# Patient Record
Sex: FEMALE | Race: WHITE | NOT HISPANIC OR LATINO | URBAN - METROPOLITAN AREA
[De-identification: names, ages, dates, MRNs, and addresses within clinical notes are randomized per-mention and may not be internally consistent; named-entity substitution may affect disease eponyms.]

---

## 2017-05-12 ENCOUNTER — OFFICE (OUTPATIENT)
Dept: URBAN - METROPOLITAN AREA CLINIC 75 | Facility: CLINIC | Age: 67
End: 2017-05-12
Payer: COMMERCIAL

## 2017-05-12 VITALS
SYSTOLIC BLOOD PRESSURE: 118 MMHG | DIASTOLIC BLOOD PRESSURE: 68 MMHG | WEIGHT: 167 LBS | HEART RATE: 92 BPM | HEIGHT: 66 IN

## 2017-05-12 DIAGNOSIS — Z86.010 PERSONAL HISTORY OF COLONIC POLYPS: ICD-10-CM

## 2017-05-12 DIAGNOSIS — R10.13 EPIGASTRIC PAIN: ICD-10-CM

## 2017-05-12 DIAGNOSIS — K59.1 FUNCTIONAL DIARRHEA: ICD-10-CM

## 2017-05-12 PROCEDURE — 99204 OFFICE O/P NEW MOD 45 MIN: CPT

## 2017-05-12 RX ORDER — DICYCLOMINE HYDROCHLORIDE 20.6 MG/1
80 TABLET ORAL
Qty: 120 | Refills: 10 | Status: ACTIVE
Start: 2017-05-12

## 2017-05-24 ENCOUNTER — OFFICE (OUTPATIENT)
Dept: URBAN - METROPOLITAN AREA CLINIC 75 | Facility: CLINIC | Age: 67
End: 2017-05-24
Payer: MEDICARE

## 2017-05-24 DIAGNOSIS — R19.7 DIARRHEA, UNSPECIFIED: ICD-10-CM

## 2017-05-24 DIAGNOSIS — Z86.010 PERSONAL HISTORY OF COLONIC POLYPS: ICD-10-CM

## 2017-05-24 PROCEDURE — 82272 OCCULT BLD FECES 1-3 TESTS: CPT

## 2022-06-16 ENCOUNTER — HOSPITAL ENCOUNTER (OUTPATIENT)
Dept: CARDIOLOGY | Facility: HOSPITAL | Age: 72
Discharge: HOME | End: 2022-06-16
Admitting: NURSE PRACTITIONER

## 2022-06-16 ENCOUNTER — TRANSCRIBE ORDERS (OUTPATIENT)
Dept: ADMINISTRATIVE | Facility: HOSPITAL | Age: 72
End: 2022-06-16

## 2022-06-16 DIAGNOSIS — R60.0 LEG EDEMA, LEFT: ICD-10-CM

## 2022-06-16 DIAGNOSIS — M79.605 LEFT LEG PAIN: ICD-10-CM

## 2022-06-16 DIAGNOSIS — M79.605 LEFT LEG PAIN: Primary | ICD-10-CM

## 2022-06-16 LAB
BH CV LOWER VASCULAR LEFT COMMON FEMORAL AUGMENT: NORMAL
BH CV LOWER VASCULAR LEFT COMMON FEMORAL COMPETENT: NORMAL
BH CV LOWER VASCULAR LEFT COMMON FEMORAL COMPRESS: NORMAL
BH CV LOWER VASCULAR LEFT COMMON FEMORAL PHASIC: NORMAL
BH CV LOWER VASCULAR LEFT COMMON FEMORAL SPONT: NORMAL
BH CV LOWER VASCULAR LEFT DISTAL FEMORAL COMPRESS: NORMAL
BH CV LOWER VASCULAR LEFT GASTRONEMIUS COMPRESS: NORMAL
BH CV LOWER VASCULAR LEFT GREATER SAPH AK COMPRESS: NORMAL
BH CV LOWER VASCULAR LEFT GREATER SAPH BK COMPRESS: NORMAL
BH CV LOWER VASCULAR LEFT LESSER SAPH COMPRESS: NORMAL
BH CV LOWER VASCULAR LEFT MID FEMORAL AUGMENT: NORMAL
BH CV LOWER VASCULAR LEFT MID FEMORAL COMPETENT: NORMAL
BH CV LOWER VASCULAR LEFT MID FEMORAL COMPRESS: NORMAL
BH CV LOWER VASCULAR LEFT MID FEMORAL PHASIC: NORMAL
BH CV LOWER VASCULAR LEFT MID FEMORAL SPONT: NORMAL
BH CV LOWER VASCULAR LEFT PERONEAL COMPRESS: NORMAL
BH CV LOWER VASCULAR LEFT POPLITEAL AUGMENT: NORMAL
BH CV LOWER VASCULAR LEFT POPLITEAL COMPETENT: NORMAL
BH CV LOWER VASCULAR LEFT POPLITEAL COMPRESS: NORMAL
BH CV LOWER VASCULAR LEFT POPLITEAL PHASIC: NORMAL
BH CV LOWER VASCULAR LEFT POPLITEAL SPONT: NORMAL
BH CV LOWER VASCULAR LEFT POSTERIOR TIBIAL COMPRESS: NORMAL
BH CV LOWER VASCULAR LEFT PROXIMAL FEMORAL COMPRESS: NORMAL
BH CV LOWER VASCULAR LEFT SAPHENOFEMORAL JUNCTION COMPRESS: NORMAL
BH CV LOWER VASCULAR LEFT VARICOSITY BK COMPRESS: NORMAL
BH CV LOWER VASCULAR RIGHT COMMON FEMORAL AUGMENT: NORMAL
BH CV LOWER VASCULAR RIGHT COMMON FEMORAL COMPETENT: NORMAL
BH CV LOWER VASCULAR RIGHT COMMON FEMORAL COMPRESS: NORMAL
BH CV LOWER VASCULAR RIGHT COMMON FEMORAL PHASIC: NORMAL
BH CV LOWER VASCULAR RIGHT COMMON FEMORAL SPONT: NORMAL
MAXIMAL PREDICTED HEART RATE: 147 BPM
STRESS TARGET HR: 125 BPM

## 2022-06-16 PROCEDURE — 93971 EXTREMITY STUDY: CPT

## 2022-12-16 ENCOUNTER — HOSPITAL ENCOUNTER (EMERGENCY)
Facility: HOSPITAL | Age: 72
Discharge: HOME | End: 2022-12-16
Attending: EMERGENCY MEDICINE | Admitting: EMERGENCY MEDICINE

## 2022-12-16 ENCOUNTER — APPOINTMENT (OUTPATIENT)
Dept: CT IMAGING | Facility: HOSPITAL | Age: 72
End: 2022-12-16

## 2022-12-16 ENCOUNTER — APPOINTMENT (OUTPATIENT)
Dept: GENERAL RADIOLOGY | Facility: HOSPITAL | Age: 72
End: 2022-12-16

## 2022-12-16 VITALS
SYSTOLIC BLOOD PRESSURE: 150 MMHG | RESPIRATION RATE: 22 BRPM | WEIGHT: 199.74 LBS | TEMPERATURE: 98 F | BODY MASS INDEX: 32.1 KG/M2 | HEART RATE: 71 BPM | OXYGEN SATURATION: 98 % | DIASTOLIC BLOOD PRESSURE: 82 MMHG | HEIGHT: 66 IN
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
## 2022-12-16 DIAGNOSIS — S62.306A CLOSED FRACTURE OF FIFTH METACARPAL BONE OF RIGHT HAND, UNSPECIFIED FRACTURE MORPHOLOGY, INITIAL ENCOUNTER: ICD-10-CM

## 2022-12-16 DIAGNOSIS — S09.90XA INJURY OF HEAD, INITIAL ENCOUNTER: ICD-10-CM

## 2022-12-16 DIAGNOSIS — M25.531 RIGHT WRIST PAIN: ICD-10-CM

## 2022-12-16 PROCEDURE — 70450 CT HEAD/BRAIN W/O DYE: CPT

## 2022-12-16 PROCEDURE — 70486 CT MAXILLOFACIAL W/O DYE: CPT

## 2022-12-16 PROCEDURE — 99283 EMERGENCY DEPT VISIT LOW MDM: CPT

## 2022-12-16 PROCEDURE — 73110 X-RAY EXAM OF WRIST: CPT

## 2022-12-16 RX ORDER — HYDROCODONE BITARTRATE AND ACETAMINOPHEN 5; 325 MG/1; MG/1
1 TABLET ORAL ONCE
Status: COMPLETED | OUTPATIENT
Start: 2022-12-16 | End: 2022-12-16

## 2022-12-16 RX ORDER — SODIUM CHLORIDE 0.9 % (FLUSH) 0.9 %
10 SYRINGE (ML) INJECTION AS NEEDED
Status: DISCONTINUED | OUTPATIENT
Start: 2022-12-16 | End: 2022-12-16 | Stop reason: HOSPADM

## 2022-12-16 RX ORDER — HYDROCODONE BITARTRATE AND ACETAMINOPHEN 5; 325 MG/1; MG/1
1 TABLET ORAL EVERY 8 HOURS PRN
Qty: 9 TABLET | Refills: 0 | Status: SHIPPED | OUTPATIENT
Start: 2022-12-16

## 2022-12-16 RX ADMIN — HYDROCODONE BITARTRATE AND ACETAMINOPHEN 1 TABLET: 5; 325 TABLET ORAL at 16:54

## 2022-12-16 NOTE — ED PROVIDER NOTES
Subjective      Provider in Triage Note  72-year-old female presents status post mechanical fall after missing a step walking into her home.  She reports that she [place] [employer] [place] as [place].  She complains of right periorbital ecchymosis and pain as well as right wrist pain.  She denies focal deficits. She reports that her legs stay swollen and she has to wear slip on sandals the last 3 months.  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  She denies chest pain dyspnea.        Provider in triage HPI reviewed and same as above.  Patient and family at bedside state that patient's tetanus status is up-to-date within the last 5 years.  Patient has abrasion to right hand.  Patient denies any blood thinners loss of consciousness or vomiting.  Patient does report a headache about 5 out of 10 that is nonradiating.  Patient and family offered blood work for patient however they did not want this at this time as they state that this was just checked.      Review of Systems   Constitutional: Negative for chills, fatigue and fever.   HENT: Negative for congestion, sore throat, tinnitus and trouble swallowing.    Eyes: Negative for photophobia, discharge and visual disturbance.   Respiratory: Negative for cough and shortness of breath.    Cardiovascular: Negative for chest pain and leg swelling.   Gastrointestinal: Negative for abdominal pain, diarrhea, nausea and vomiting.   Genitourinary: Negative for dysuria, flank pain and urgency.   Musculoskeletal: Negative for arthralgias and myalgias.        Right hand and wrist pain.   Skin: Positive for wound. Negative for rash.   Neurological: Positive for headaches. Negative for dizziness and syncope.   Psychiatric/Behavioral: Negative for confusion.       History reviewed. No pertinent past medical history.    Allergies   Allergen Reactions   • Sulfa Antibiotics GI Intolerance       History  reviewed. No pertinent surgical history.    History reviewed. No pertinent family history.    Social History     Socioeconomic History   • Marital status:            Objective   Physical Exam  Vitals and nursing note reviewed.   Constitutional:       General: She is not in acute distress.     Appearance: She is well-developed. She is not diaphoretic.   HENT:      Head: Normocephalic and atraumatic.      Comments: No rogers signs or raccoon eyes.  Patient has mild ecchymosis of right eyebrow and localized swelling over right eyebrow, lateral portion.     Right Ear: Ear canal and external ear normal.      Left Ear: Ear canal and external ear normal.      Nose: Nose normal.      Mouth/Throat:      Mouth: Mucous membranes are moist.   Eyes:      Extraocular Movements: Extraocular movements intact.      Conjunctiva/sclera: Conjunctivae normal.      Pupils: Pupils are equal, round, and reactive to light.   Cardiovascular:      Rate and Rhythm: Normal rate and regular rhythm.      Pulses: Normal pulses.      Heart sounds: Normal heart sounds.      Comments: S1, S2 audible.  Pulmonary:      Effort: Pulmonary effort is normal. No respiratory distress.      Breath sounds: Normal breath sounds. No wheezing, rhonchi or rales.      Comments: On room air.  Abdominal:      General: Bowel sounds are normal. There is no distension.      Palpations: Abdomen is soft.      Tenderness: There is no abdominal tenderness. There is no guarding or rebound.   Musculoskeletal:         General: Swelling and tenderness present. No deformity.      Cervical back: Normal range of motion.      Right lower leg: No edema.      Left lower leg: No edema.      Comments: Patient has decreased range of motion of right hand regarding  strength secondary to pain and swelling base of right hand.  Patient has tenderness base of of right pinky.   Skin:     General: Skin is warm.      Capillary Refill: Capillary refill takes less than 2 seconds.       "Findings: No erythema or rash.   Neurological:      General: No focal deficit present.      Mental Status: She is alert and oriented to person, place, and time.      Cranial Nerves: No cranial nerve deficit.      Comments: Patient alert and oriented times 3 out of 3 moving all 4 extremities without issue.   Psychiatric:         Mood and Affect: Mood normal.         Behavior: Behavior normal.         Splint - Cast - Strapping    Date/Time: 12/16/2022 5:38 PM  Performed by: Laci Whittaker PA  Authorized by: Jameson Carlson MD     Consent:     Consent obtained:  Verbal    Consent given by:  Patient    Risks discussed:  Discoloration, numbness, pain and swelling    Alternatives discussed:  No treatment  Universal protocol:     Imaging studies available: yes      Patient identity confirmed:  Verbally with patient and arm band  Pre-procedure details:     Distal neurologic exam:  Normal    Distal perfusion: distal pulses strong and brisk capillary refill    Procedure details:     Location:  Hand    Hand location:  R hand    Cast type:  Short arm    Splint type:  Wrist    Supplies:  Fiberglass    Attestation: Splint applied and adjusted personally by me    Post-procedure details:     Distal neurologic exam:  Normal    Distal perfusion: distal pulses strong, brisk capillary refill and unchanged      Procedure completion:  Tolerated well, no immediate complications               ED Course      /82 (BP Location: Right arm, Patient Position: Lying)   Pulse 71   Temp 98 °F (36.7 °C) (Oral)   Resp 22   Ht 167.6 cm (66\")   Wt 90.6 kg (199 lb 11.8 oz)   SpO2 98%   BMI 32.24 kg/m²   Labs Reviewed - No data to display  XR Wrist 3+ View Right    Result Date: 12/16/2022  1.     Comminuted nondisplaced fracture involving the proximal head of the fifth metacarpal bone. There is no dislocation.  Electronically Signed By-Demarcus Nicholson MD On:12/16/2022 3:26 PM This report was finalized on [id number] by  Demarcus Nicholson, " "MD.    CT Head Without Contrast    Result Date: 12/16/2022  1.     No evidence for acute intracranial abnormality.  Electronically Signed By-Demarcus Nicholson MD On:12/16/2022 3:21 PM This report was finalized on [id number] by  Demarcus Nicholson MD.    CT Facial Bones Without Contrast    Result Date: 12/16/2022  1.     No evidence for displaced fracture involving the maxillofacial bones or mandible. 2.     There is edema with hemorrhage and small hematoma involving the soft tissues along the superior margin of the right orbit extending into the superior eyelid. The underlying right orbit remains intact.  Electronically Signed By-Demarcus Nicholson MD On:12/16/2022 3:25 PM This report was finalized on [id number] by  Demarcus Nicholson MD.                                         MDM    Chart review:  Sulfa abx allergy.  EKG:  Not indicated    Imaging:  See above  Labs:  Patient deferred.  Vitals:  /82 (BP Location: Right arm, Patient Position: Lying)   Pulse 71   Temp 98 °F (36.7 °C) (Oral)   Resp 22   Ht 167.6 cm (66\")   Wt 90.6 kg (199 lb 11.8 oz)   SpO2 98%   BMI 32.24 kg/m²     Medications given:    Medications   sodium chloride 0.9 % flush 10 mL (has no administration in time range)   HYDROcodone-acetaminophen (NORCO) 5-325 MG per tablet 1 tablet (1 tablet Oral Given 12/16/22 1654)       Procedures:  Not indicated  MDM: Patient is a 72-year-old female comes in complaining of mechanical fall injury of right hand and head injury.  CT head unremarkable for acute findings.  CT facial bones shows edema and hemorrhage and small hematoma involving the soft tissues along the superior margin of the right orbit extending into the superior eyelid.  The underlying right orbit remains intact.  X-ray right wrist shows comminuted nondisplaced fracture of proximal right fifth metacarpal.  Patient was fit with ulnar gutter splint.  Patient was neurovascular intact distal to injury before and after splint application.  " Patient was also fit with a sling of right upper extremity.  Patient was given head precautions and strict return precautions and voiced understanding.  Tetanus status reportedly up-to-date within the last 5 years.  Patient appears no acute distress on reevaluation.  Norco was ordered for pain control and patient was sent home on a short prescription of Norco, inspect verified and unremarkable. See full discharge instructions for further details.  Results and plan discussed with patient and is agreeable with plan.    Final diagnoses:   Fall, initial encounter   Injury of head, initial encounter   Right wrist pain   Closed fracture of fifth metacarpal bone of right hand, unspecified fracture morphology, initial encounter       ED Disposition  ED Disposition     ED Disposition   Discharge    Condition   Stable    Comment   --             [facility] EMERGENCY DEPARTMENT  [address] [facility] [id number] [phone]  Go in 1 day  As needed, If symptoms worsen    Delia Weaver, APRN  [address] [place] [address]  [phone]    Call in 1 week  As needed    Jasper Trevizo MD  [address] [place] [address]  [phone]    Schedule an appointment as soon as possible for a visit in 3 days  for hand fracture follow up         Medication List      New Prescriptions    HYDROcodone-acetaminophen 5-325 MG per tablet  Commonly known as: NORCO  Take 1 tablet by mouth Every 8 (Eight) Hours As Needed for Severe Pain.           Where to Get Your Medications      These medications were sent to [facility]/pharmacy #[id number] - [place], [place] - [address] [facility] - [phone] [facility] [phone]   [address] [facility] [place] [address]    Hours: 24-hours Phone: [phone]   · HYDROcodone-acetaminophen 5-325 MG per tablet          Laci Whittaker PA  12/16/22 [id number]       Laci Whittaker PA  12/16/22 2443

## 2022-12-16 NOTE — DISCHARGE INSTRUCTIONS
Please follow head injury precautions above.  Please keep splint dry and keep this on until follow-up with orthopedic surgery, if you are fingers turn color or feel like the splint is too tight you can take off the Ace wrap and reapply the splint if absolutely necessary. Please follow-up with orthopedic surgery in 3 days by giving them a call on Monday for follow-up appointment with Dr. Trevizo. Please use sling for comfort however you can take this off as you like.  Please come back to the ER if you have severe vomiting or not acting yourself as you will need reevaluation at that time.  Please take pain medicine as prescribed as needed.

## 2022-12-16 NOTE — ED NOTES
Pt A&Ox 4 with lungs CTA bases. Pt ambulatory at this time.   Discharge instruction given to pt with verbal understanding received. Pt discharged with education on follow up appointment, RX medication including side effects, and personal belongings.

## 2023-03-29 ENCOUNTER — HOSPITAL ENCOUNTER (OUTPATIENT)
Dept: CARDIOLOGY | Facility: HOSPITAL | Age: 73
Discharge: HOME | End: 2023-03-29
Payer: MEDICARE

## 2023-03-29 ENCOUNTER — LAB (OUTPATIENT)
Dept: LAB | Facility: HOSPITAL | Age: 73
End: 2023-03-29
Payer: MEDICARE

## 2023-03-29 ENCOUNTER — TRANSCRIBE ORDERS (OUTPATIENT)
Dept: ADMINISTRATIVE | Facility: HOSPITAL | Age: 73
End: 2023-03-29
Payer: MEDICARE

## 2023-03-29 DIAGNOSIS — Z01.818 PREOP TESTING: Primary | ICD-10-CM

## 2023-03-29 DIAGNOSIS — N36.42 INTRINSIC SPHINCTER DEFICIENCY (ISD): ICD-10-CM

## 2023-03-29 DIAGNOSIS — Z01.818 PREOP TESTING: ICD-10-CM

## 2023-03-29 LAB
ANION GAP SERPL CALCULATED.3IONS-SCNC: 8.5 MMOL/L (ref 5–15)
BASOPHILS # BLD AUTO: 0.05 10*3/MM3 (ref 0–0.2)
BASOPHILS NFR BLD AUTO: 0.8 % (ref 0–1.5)
BUN SERPL-MCNC: 10 MG/DL (ref 8–23)
BUN/CREAT SERPL: 10.9 (ref 7–25)
CALCIUM SPEC-SCNC: 9.3 MG/DL (ref 8.6–10.5)
CHLORIDE SERPL-SCNC: 102 MMOL/L (ref 98–107)
CO2 SERPL-SCNC: 29.5 MMOL/L (ref 22–29)
CREAT SERPL-MCNC: 0.92 MG/DL (ref 0.57–1)
DEPRECATED RDW RBC AUTO: 38.8 FL (ref 37–54)
EGFRCR SERPLBLD CKD-EPI 2021: 65.5 ML/MIN/1.73
EOSINOPHIL # BLD AUTO: 0.17 10*3/MM3 (ref 0–0.4)
EOSINOPHIL NFR BLD AUTO: 2.8 % (ref 0.3–6.2)
ERYTHROCYTE [DISTWIDTH] IN BLOOD BY AUTOMATED COUNT: 12.4 % (ref 12.3–15.4)
GLUCOSE SERPL-MCNC: 85 MG/DL (ref 65–99)
HCT VFR BLD AUTO: 31 % (ref 34–46.6)
HGB BLD-MCNC: 9.8 G/DL (ref 12–15.9)
IMM GRANULOCYTES # BLD AUTO: 0.02 10*3/MM3 (ref 0–0.05)
IMM GRANULOCYTES NFR BLD AUTO: 0.3 % (ref 0–0.5)
LYMPHOCYTES # BLD AUTO: 1.6 10*3/MM3 (ref 0.7–3.1)
LYMPHOCYTES NFR BLD AUTO: 25.9 % (ref 19.6–45.3)
MCH RBC QN AUTO: 27.5 PG (ref 26.6–33)
MCHC RBC AUTO-ENTMCNC: 31.6 G/DL (ref 31.5–35.7)
MCV RBC AUTO: 86.8 FL (ref 79–97)
MONOCYTES # BLD AUTO: 0.41 10*3/MM3 (ref 0.1–0.9)
MONOCYTES NFR BLD AUTO: 6.6 % (ref 5–12)
NEUTROPHILS NFR BLD AUTO: 3.92 10*3/MM3 (ref 1.7–7)
NEUTROPHILS NFR BLD AUTO: 63.6 % (ref 42.7–76)
NRBC BLD AUTO-RTO: 0 /100 WBC (ref 0–0.2)
PLATELET # BLD AUTO: 279 10*3/MM3 (ref 140–450)
PMV BLD AUTO: 10.8 FL (ref 6–12)
POTASSIUM SERPL-SCNC: 3.4 MMOL/L (ref 3.5–5.2)
RBC # BLD AUTO: 3.57 10*6/MM3 (ref 3.77–5.28)
SODIUM SERPL-SCNC: 140 MMOL/L (ref 136–145)
WBC NRBC COR # BLD: 6.17 10*3/MM3 (ref 3.4–10.8)

## 2023-03-29 PROCEDURE — 93005 ELECTROCARDIOGRAM TRACING: CPT | Performed by: UROLOGY

## 2023-03-29 PROCEDURE — 85025 COMPLETE CBC W/AUTO DIFF WBC: CPT

## 2023-03-29 PROCEDURE — 80048 BASIC METABOLIC PNL TOTAL CA: CPT

## 2023-03-29 PROCEDURE — 36415 COLL VENOUS BLD VENIPUNCTURE: CPT

## 2023-04-01 LAB — QT INTERVAL: 432 MS
